# Patient Record
Sex: FEMALE | Race: WHITE | NOT HISPANIC OR LATINO | Employment: PART TIME | URBAN - METROPOLITAN AREA
[De-identification: names, ages, dates, MRNs, and addresses within clinical notes are randomized per-mention and may not be internally consistent; named-entity substitution may affect disease eponyms.]

---

## 2022-11-27 ENCOUNTER — OFFICE VISIT (OUTPATIENT)
Dept: URGENT CARE | Facility: CLINIC | Age: 37
End: 2022-11-27

## 2022-11-27 VITALS — HEART RATE: 100 BPM | OXYGEN SATURATION: 97 % | TEMPERATURE: 99.2 F | RESPIRATION RATE: 18 BRPM

## 2022-11-27 DIAGNOSIS — J02.9 SORE THROAT: Primary | ICD-10-CM

## 2022-11-27 RX ORDER — HYDROCHLOROTHIAZIDE 12.5 MG/1
12.5 CAPSULE, GELATIN COATED ORAL DAILY
COMMUNITY
Start: 2022-11-03

## 2022-11-27 RX ORDER — CLINDAMYCIN PHOSPHATE 11.9 MG/ML
SOLUTION TOPICAL 2 TIMES DAILY
COMMUNITY
Start: 2022-08-29

## 2022-11-27 RX ORDER — TRETINOIN 0.5 MG/G
CREAM TOPICAL
COMMUNITY
Start: 2022-08-26

## 2022-11-27 RX ORDER — CETIRIZINE HYDROCHLORIDE 10 MG/1
TABLET ORAL
COMMUNITY

## 2022-11-27 RX ORDER — LANCETS
EACH MISCELLANEOUS 4 TIMES DAILY
COMMUNITY
Start: 2022-10-06

## 2022-11-27 RX ORDER — CALCIUM CARBONATE 300MG(750)
400 TABLET,CHEWABLE ORAL DAILY
COMMUNITY
Start: 2022-11-03

## 2022-11-27 RX ORDER — FUROSEMIDE 20 MG/1
TABLET ORAL
COMMUNITY

## 2022-11-27 RX ORDER — METFORMIN HYDROCHLORIDE 500 MG/1
1 TABLET, EXTENDED RELEASE ORAL DAILY
COMMUNITY
Start: 2022-06-16

## 2022-11-27 RX ORDER — CYCLOBENZAPRINE HCL 10 MG
10 TABLET ORAL
COMMUNITY
Start: 2022-08-23

## 2022-11-27 RX ORDER — NALTREXONE HYDROCHLORIDE 50 MG/1
TABLET, FILM COATED ORAL
COMMUNITY
Start: 2022-11-16

## 2022-11-27 RX ORDER — AMOXICILLIN 500 MG/1
500 CAPSULE ORAL EVERY 12 HOURS SCHEDULED
Qty: 14 CAPSULE | Refills: 0 | Status: SHIPPED | OUTPATIENT
Start: 2022-11-27 | End: 2022-12-04

## 2022-11-27 RX ORDER — FUROSEMIDE 40 MG/1
40 TABLET ORAL
COMMUNITY
Start: 2022-09-08

## 2022-11-27 RX ORDER — TORSEMIDE 10 MG/1
TABLET ORAL
COMMUNITY

## 2022-11-27 RX ORDER — IBUPROFEN 600 MG/1
600 TABLET ORAL 3 TIMES DAILY
COMMUNITY
Start: 2022-08-31

## 2022-11-27 RX ORDER — MULTIVIT,CALC,MINS/IRON/FOLIC 500-18-0.4
1 TABLET ORAL DAILY
COMMUNITY

## 2022-11-27 RX ORDER — BACILLUS COAGULANS/INULIN 1B-250 MG
CAPSULE ORAL
COMMUNITY

## 2022-11-27 RX ORDER — NORGESTIMATE AND ETHINYL ESTRADIOL 7DAYSX3 28
KIT ORAL
COMMUNITY

## 2022-11-27 RX ORDER — LOPERAMIDE HYDROCHLORIDE 2 MG/1
CAPSULE ORAL
COMMUNITY
Start: 2022-06-20

## 2022-11-27 RX ORDER — MAGNESIUM OXIDE 400 MG/1
TABLET ORAL
COMMUNITY

## 2022-11-27 RX ORDER — SPIRONOLACTONE 25 MG/1
TABLET ORAL
COMMUNITY

## 2022-11-27 RX ORDER — PHENTERMINE HYDROCHLORIDE 37.5 MG/1
37.5 CAPSULE ORAL DAILY
COMMUNITY
Start: 2022-08-05

## 2022-11-27 RX ORDER — GABAPENTIN 600 MG/1
600 TABLET ORAL 3 TIMES DAILY
COMMUNITY
Start: 2022-10-10

## 2022-11-27 RX ORDER — ONDANSETRON 4 MG/1
4 TABLET, FILM COATED ORAL
COMMUNITY
Start: 2022-08-23

## 2022-11-27 RX ORDER — TRIAMCINOLONE ACETONIDE 1 MG/G
CREAM TOPICAL 2 TIMES DAILY
COMMUNITY
Start: 2022-08-25

## 2022-11-27 RX ORDER — BUPROPION HYDROCHLORIDE 75 MG/1
TABLET ORAL
COMMUNITY
Start: 2022-11-16

## 2022-11-27 RX ORDER — IPRATROPIUM BROMIDE AND ALBUTEROL SULFATE 2.5; .5 MG/3ML; MG/3ML
SOLUTION RESPIRATORY (INHALATION)
COMMUNITY

## 2022-11-27 RX ORDER — POLYETHYLENE GLYCOL 3350 17 G/17G
17 POWDER, FOR SOLUTION ORAL
COMMUNITY
Start: 2022-08-25

## 2022-11-27 RX ORDER — BLOOD SUGAR DIAGNOSTIC
STRIP MISCELLANEOUS 4 TIMES DAILY
COMMUNITY
Start: 2022-10-06

## 2022-11-27 RX ORDER — ASCORBIC ACID 500 MG
1 TABLET ORAL DAILY
COMMUNITY

## 2022-11-27 RX ORDER — AMITRIPTYLINE HYDROCHLORIDE 75 MG/1
TABLET, FILM COATED ORAL
COMMUNITY

## 2022-11-27 RX ORDER — PHENOL 1.4 %
15 AEROSOL, SPRAY (ML) MUCOUS MEMBRANE DAILY
COMMUNITY

## 2022-11-27 RX ORDER — PSEUDOEPHEDRINE HCL 30 MG
100 TABLET ORAL 2 TIMES DAILY
COMMUNITY
Start: 2022-08-25

## 2022-11-27 RX ORDER — SUMATRIPTAN 50 MG/1
TABLET, FILM COATED ORAL
COMMUNITY

## 2022-11-27 RX ORDER — OMEPRAZOLE 40 MG/1
CAPSULE, DELAYED RELEASE ORAL
COMMUNITY
Start: 2022-08-24

## 2022-11-27 RX ORDER — DICYCLOMINE HCL 20 MG
1 TABLET ORAL 4 TIMES DAILY
COMMUNITY
Start: 2016-03-01

## 2022-11-27 RX ORDER — DICYCLOMINE HYDROCHLORIDE 10 MG/1
CAPSULE ORAL
COMMUNITY
Start: 2022-08-20

## 2022-11-27 NOTE — LETTER
November 27, 2022     Patient: Abraham Hernandez   YOB: 1985   Date of Visit: 11/27/2022       To Whom it May Concern:    Abraham Hernandez was seen in my clinic on 11/27/2022  She is excused from work 11/28/2022    If you have any questions or concerns, please don't hesitate to call           Sincerely,          Louis Maria PA-C        CC: No Recipients

## 2022-11-27 NOTE — PROGRESS NOTES
[facility]        NAME: Lenny Guo is a 40 y o  female  : 1985    MRN: [id number]  DATE: 2022  TIME: 2:25 PM    Assessment and Plan   Sore throat [J02 9]  1  Sore throat  amoxicillin (AMOXIL) 500 mg capsule            Patient Instructions       Follow up with PCP in 3-5 days  Proceed to  ER if symptoms worsen  Chief Complaint     Chief Complaint   Patient presents with   • Sore Throat     Patient reported she's been having a sore throat, like something is stuck, with runny nose and chills  Patient reported she recently had a biopsy done in her RIGHT breast on Friday  Patient reported chills and runny nose started this morning, but her throat has been off and on for a week  Patient reported she's been taking Tylenol for her biopsy  History of Present Illness       Patient is a 39 yo female who presents for evaluation of sore throat x 1 week as well as nasal congestion and chills starting today  Denies any known fevers, dysphagia, abdominal pain, n/v/d, rash, arthralgias, mylagias, rash  Patient also states she had a breast biopsy three days ago and wants to make sure the area isn't infected       Review of Systems   Review of Systems   Constitutional: Positive for chills  Negative for fatigue and fever  HENT: Positive for congestion and sore throat  Negative for ear discharge, ear pain, postnasal drip, rhinorrhea, sinus pressure, sinus pain, trouble swallowing and voice change  Respiratory: Negative for cough, chest tightness, shortness of breath and wheezing  Cardiovascular: Negative for chest pain and palpitations  Gastrointestinal: Negative for abdominal pain, diarrhea, nausea and vomiting  Musculoskeletal: Negative for arthralgias and myalgias  Neurological: Negative for weakness  Psychiatric/Behavioral: Negative for confusion           Current Medications       Current Outpatient Medications:   •  amitriptyline (ELAVIL) 75 mg tablet, amitriptyline 75 mg tablet, Disp: , Rfl:   •  amoxicillin (AMOXIL) 500 mg capsule, Take 1 capsule (500 mg total) by mouth every 12 (twelve) hours for 7 days, Disp: 14 capsule, Rfl: 0  •  Apple Cider Vinegar 600 MG CAPS, Take by mouth, Disp: , Rfl:   •  Bacillus Coagulans-Inulin (Probiotic) 1-250 BILLION-MG CAPS, 1 cap(s), Disp: , Rfl:   •  buPROPion (WELLBUTRIN) 75 mg tablet, Week 1: one tab once daily Week 2: one tab twice a day Week 3: two tabs in AM & one tab in evening   Week 4: two tabs twice a day , Disp: , Rfl:   •  cetirizine (ZyrTEC) 10 mg tablet, cetirizine 10 mg tablet, Disp: , Rfl:   •  clindamycin (CLEOCIN T) 1 % external solution, Apply topically 2 (two) times a day, Disp: , Rfl:   •  cyclobenzaprine (FLEXERIL) 10 mg tablet, Take 10 mg by mouth, Disp: , Rfl:   •  dicyclomine (BENTYL) 10 mg capsule, TAKE ONE CAPSULE BY MOUTH FOUR TIMES A DAY AS NEEDED FOR ABDOMINAL SPASM / PAIN, Disp: , Rfl:   •  dicyclomine (BENTYL) 20 mg tablet, Take 1 tablet by mouth 4 (four) times a day, Disp: , Rfl:   •  Docusate Sodium (DSS) 100 MG CAPS, Take 100 mg by mouth 2 (two) times a day, Disp: , Rfl:   •  furosemide (LASIX) 20 mg tablet, furosemide 20 mg tablet, Disp: , Rfl:   •  furosemide (LASIX) 40 mg tablet, Take 40 mg by mouth, Disp: , Rfl:   •  gabapentin (NEURONTIN) 600 MG tablet, Take 600 mg by mouth Three times a day, Disp: , Rfl:   •  glucose blood (OneTouch Ultra) test strip, 4 (four) times a day, Disp: , Rfl:   •  hydrochlorothiazide (MICROZIDE) 12 5 mg capsule, Take 12 5 mg by mouth daily, Disp: , Rfl:   •  ibuprofen (MOTRIN) 600 mg tablet, Take 600 mg by mouth Three times a day, Disp: , Rfl:   •  ipratropium-albuterol (DUO-NEB) 0 5-2 5 mg/3 mL nebulizer solution, ipratropium 0 5 mg-albuterol 3 mg (2 5 mg base)/3 mL nebulization soln, Disp: , Rfl:   •  Lancets (onetouch ultrasoft) lancets, 4 (four) times a day, Disp: , Rfl:   •  loperamide (IMODIUM) 2 mg capsule, TAKE 1 CAPSULE BY MOUTH EVERY MORNING, AT NOON, IN THE EVENING AND BEFORE BEDTIME AS NEEDED FOR DIARRHEA, Disp: , Rfl:   •  Magnesium 400 MG TABS, Take 400 mg by mouth daily, Disp: , Rfl:   •  magnesium oxide (MAG-OX) 400 mg tablet, magnesium oxide 400 mg (241 3 mg magnesium) tablet, Disp: , Rfl:   •  Melatonin 10 MG TABS, Take 15 mg by mouth daily, Disp: , Rfl:   •  metFORMIN (GLUCOPHAGE-XR) 500 mg 24 hr tablet, Take 1 tablet by mouth daily, Disp: , Rfl:   •  Multiple Vitamin (Multi-Day) TABS, Take 1 tablet by mouth daily, Disp: , Rfl:   •  Multiple Vitamins-Calcium (One-A-Day Womens Formula) TABS, Take 1 tablet by mouth daily, Disp: , Rfl:   •  naltrexone (REVIA) 50 mg tablet, Take 1/2 tablet daily for one week, then 1/2 tablet twice daily, Disp: , Rfl:   •  norgestimate-ethinyl estradiol (ORTHO TRI-CYCLEN,TRINESSA) 0 18/0 215/0 25 MG-35 MCG per tablet, Take by mouth, Disp: , Rfl:   •  omeprazole (PriLOSEC) 40 MG capsule, Take 1 capsule by mouth at least 30 minutes before breakfast , Disp: , Rfl:   •  ondansetron (ZOFRAN) 4 mg tablet, Take 4 mg by mouth, Disp: , Rfl:   •  phentermine 37 5 MG capsule, Take 37 5 mg by mouth daily, Disp: , Rfl:   •  polyethylene glycol (GLYCOLAX) 17 GM/SCOOP powder, Take 17 g by mouth, Disp: , Rfl:   •  Riboflavin (Vitamin B-2) 100 MG TABS, Take by mouth, Disp: , Rfl:   •  rifaximin (XIFAXAN) 550 mg tablet, Xifaxan 550 mg tablet, Disp: , Rfl:   •  spironolactone (ALDACTONE) 25 mg tablet, spironolactone 25 mg tablet, Disp: , Rfl:   •  SUMAtriptan (IMITREX) 50 mg tablet, sumatriptan 50 mg tablet, Disp: , Rfl:   •  torsemide (DEMADEX) 10 mg tablet, torsemide 10 mg tablet, Disp: , Rfl:   •  tretinoin (REFISSA) 0 05 % cream, Apply pea sized amount to entire face, Disp: , Rfl:   •  triamcinolone (KENALOG) 0 1 % cream, Apply topically 2 (two) times a day, Disp: , Rfl:     Current Allergies     Allergies as of 11/27/2022 - never reviewed   Allergen Reaction Noted   • Iohexol Rash 06/14/2021            The following portions of the patient's history were reviewed and updated as appropriate: allergies, current medications, past family history, past medical history, past social history, past surgical history and problem list      Past Medical History:   Diagnosis Date   • Asthma    • Breast cancer detected by national screening programme [facility])     did Biopsy to confirm   • Diabetes ([facility] [address] )    • Hypertension    • Kidney stones    • Migraine        No past surgical history on file  No family history on file  Medications have been verified  Objective   Pulse 100   Temp 99 2 °F (37 3 °C) (Tympanic)   Resp 18   SpO2 97%        Physical Exam     Physical Exam  Constitutional:       General: She is not in acute distress  Appearance: She is not toxic-appearing  HENT:      Right Ear: Tympanic membrane, ear canal and external ear normal       Left Ear: Tympanic membrane, ear canal and external ear normal       Nose: Congestion present  Mouth/Throat:      Comments: Patient's posterior oropharynx diffusely erythematous with mild swelling  No tonsillar enlargement, no exudates  Uvula midline  Tolerating oral secretions  No drooling, stridor, or trismus  Cardiovascular:      Rate and Rhythm: Normal rate  Heart sounds: Normal heart sounds  Pulmonary:      Effort: Pulmonary effort is normal       Breath sounds: Normal breath sounds  Skin:     General: Skin is warm and dry  Findings: No rash  Comments: Right breast incision with good healing  No edema, erythema, drainage    Neurological:      Mental Status: She is alert     Psychiatric:         Mood and Affect: Mood normal          Behavior: Behavior normal